# Patient Record
Sex: FEMALE | Race: WHITE | NOT HISPANIC OR LATINO | URBAN - METROPOLITAN AREA
[De-identification: names, ages, dates, MRNs, and addresses within clinical notes are randomized per-mention and may not be internally consistent; named-entity substitution may affect disease eponyms.]

---

## 2018-01-01 ENCOUNTER — HOME CARE/HOSPICE - HEALTHEAST (OUTPATIENT)
Dept: HOSPICE | Facility: HOSPICE | Age: 83
End: 2018-01-01

## 2018-01-01 ENCOUNTER — ANESTHESIA - HEALTHEAST (OUTPATIENT)
Dept: SURGERY | Facility: CLINIC | Age: 83
End: 2018-01-01

## 2018-01-01 ENCOUNTER — OFFICE VISIT - HEALTHEAST (OUTPATIENT)
Dept: GERIATRICS | Facility: CLINIC | Age: 83
End: 2018-01-01

## 2018-01-01 ENCOUNTER — AMBULATORY - HEALTHEAST (OUTPATIENT)
Dept: NEUROSURGERY | Facility: CLINIC | Age: 83
End: 2018-01-01

## 2018-01-01 ENCOUNTER — COMMUNICATION - HEALTHEAST (OUTPATIENT)
Dept: SCHEDULING | Facility: CLINIC | Age: 83
End: 2018-01-01

## 2018-01-01 ENCOUNTER — SURGERY - HEALTHEAST (OUTPATIENT)
Dept: SURGERY | Facility: CLINIC | Age: 83
End: 2018-01-01

## 2018-01-01 ENCOUNTER — AMBULATORY - HEALTHEAST (OUTPATIENT)
Dept: FAMILY MEDICINE | Facility: CLINIC | Age: 83
End: 2018-01-01

## 2018-01-01 ENCOUNTER — AMBULATORY - HEALTHEAST (OUTPATIENT)
Dept: HOSPICE | Facility: HOSPICE | Age: 83
End: 2018-01-01

## 2018-01-01 ENCOUNTER — COMMUNICATION - HEALTHEAST (OUTPATIENT)
Dept: NEUROSURGERY | Facility: CLINIC | Age: 83
End: 2018-01-01

## 2018-01-01 DIAGNOSIS — S06.5XAA TRAUMATIC SUBDURAL HEMATOMA (H): ICD-10-CM

## 2018-01-01 RX ORDER — HYDROMORPHONE HYDROCHLORIDE 1 MG/ML
2 SOLUTION ORAL EVERY 4 HOURS
Status: SHIPPED | COMMUNITY
Start: 2018-01-01

## 2018-01-01 RX ORDER — LORAZEPAM 2 MG/ML
0.5 CONCENTRATE ORAL EVERY 4 HOURS
Status: SHIPPED | COMMUNITY
Start: 2018-01-01

## 2018-01-01 RX ORDER — ACETAMINOPHEN 650 MG/1
650 SUPPOSITORY RECTAL EVERY 4 HOURS PRN
Status: SHIPPED | COMMUNITY
Start: 2018-01-01

## 2018-01-01 RX ORDER — ATROPINE SULFATE 10 MG/ML
2 SOLUTION/ DROPS OPHTHALMIC EVERY 4 HOURS
Status: SHIPPED | COMMUNITY
Start: 2018-01-01

## 2018-01-01 RX ORDER — HYDROMORPHONE HYDROCHLORIDE 1 MG/ML
2 SOLUTION ORAL
Status: SHIPPED | COMMUNITY
Start: 2018-01-01

## 2018-01-01 RX ORDER — LORAZEPAM 2 MG/ML
0.5 CONCENTRATE ORAL
Status: SHIPPED | COMMUNITY
Start: 2018-01-01

## 2018-01-01 ASSESSMENT — MIFFLIN-ST. JEOR
SCORE: 940.2
SCORE: 970.59
SCORE: 968.33
SCORE: 985.56
SCORE: 903.46
SCORE: 981.93
SCORE: 985.73
SCORE: 997.81
SCORE: 969.23
SCORE: 959.25
SCORE: 903.46
SCORE: 992.82
SCORE: 967.87
SCORE: 963.79
SCORE: 957.89

## 2018-04-27 ENCOUNTER — AMBULATORY - HEALTHEAST (OUTPATIENT)
Dept: GERIATRICS | Facility: CLINIC | Age: 83
End: 2018-04-27

## 2021-05-26 ENCOUNTER — RECORDS - HEALTHEAST (OUTPATIENT)
Dept: ADMINISTRATIVE | Facility: CLINIC | Age: 86
End: 2021-05-26

## 2021-05-27 ENCOUNTER — RECORDS - HEALTHEAST (OUTPATIENT)
Dept: ADMINISTRATIVE | Facility: CLINIC | Age: 86
End: 2021-05-27

## 2021-05-28 ENCOUNTER — RECORDS - HEALTHEAST (OUTPATIENT)
Dept: ADMINISTRATIVE | Facility: CLINIC | Age: 86
End: 2021-05-28

## 2021-05-29 ENCOUNTER — RECORDS - HEALTHEAST (OUTPATIENT)
Dept: ADMINISTRATIVE | Facility: CLINIC | Age: 86
End: 2021-05-29

## 2021-05-30 ENCOUNTER — RECORDS - HEALTHEAST (OUTPATIENT)
Dept: ADMINISTRATIVE | Facility: CLINIC | Age: 86
End: 2021-05-30

## 2021-06-01 VITALS — WEIGHT: 132.4 LBS | HEIGHT: 64 IN | BODY MASS INDEX: 22.61 KG/M2

## 2021-06-01 VITALS — WEIGHT: 132 LBS | BODY MASS INDEX: 22.66 KG/M2

## 2021-06-16 PROBLEM — S06.5XAA SDH (SUBDURAL HEMATOMA) (H): Status: ACTIVE | Noted: 2018-01-01

## 2021-06-16 PROBLEM — Z86.718 HISTORY OF DVT (DEEP VEIN THROMBOSIS): Status: ACTIVE | Noted: 2018-01-01

## 2021-06-16 PROBLEM — M48.061 LUMBAR SPINAL STENOSIS: Status: ACTIVE | Noted: 2017-01-01

## 2021-06-16 PROBLEM — Z97.8 USES FEEDING TUBE: Status: ACTIVE | Noted: 2018-01-01

## 2021-06-16 PROBLEM — I48.91 A-FIB (H): Status: ACTIVE | Noted: 2018-01-01

## 2021-06-16 PROBLEM — J96.00 ARF (ACUTE RESPIRATORY FAILURE) (H): Status: ACTIVE | Noted: 2018-01-01

## 2021-06-16 PROBLEM — Z86.711 HISTORY OF PULMONARY EMBOLUS (PE): Status: ACTIVE | Noted: 2018-01-01

## 2021-06-16 PROBLEM — I47.19 PAROXYSMAL ATRIAL TACHYCARDIA (H): Status: ACTIVE | Noted: 2018-01-01

## 2021-06-16 PROBLEM — D69.6 THROMBOCYTOPENIA (H): Status: ACTIVE | Noted: 2018-01-01

## 2021-06-16 PROBLEM — Z66 DO NOT RESUSCITATE: Status: ACTIVE | Noted: 2018-01-01

## 2021-06-16 PROBLEM — Z79.01 CHRONIC ANTICOAGULATION: Status: ACTIVE | Noted: 2018-01-01

## 2021-06-16 PROBLEM — Z98.890 S/P CRANIOTOMY: Status: ACTIVE | Noted: 2018-01-01

## 2021-06-17 NOTE — ANESTHESIA PROCEDURE NOTES
Arterial Line  Reason for Procedure: hemodynamic monitoring  Patient location during procedure: OR post-induction  Start time: 4/5/2018 9:30 AM  End time: 4/5/2018 9:35 AM  Staffing:  Performing  Anesthesiologist: SENAIT VELASQUEZ  Performing CRNA: MADDY HICKMAN  Sterile Precautions:  sterile barriers used during insertion: cap, mask, sterile gloves, large sheet, and hand hygiene used.  Arterial Line:   Immediately prior to procedure a time out was called to verify the correct patient, procedure, equipment, support staff and site/side marked as required  Laterality: right  Location: radial  Prepped with: ChloroPrep    Needle gauge: 20 G  Number of Attempts: 1  Secured with: tape  Flushed with: saline    Ultrasound evaluation of access site: yes        Additional Notes:  Secured with Tegaderm and biopatch

## 2021-06-17 NOTE — ANESTHESIA CARE TRANSFER NOTE
To PACU on vent VSS stable report to RN. RT present attached.     Last vitals:   Vitals:    04/05/18 1304   BP: 115/57   Pulse: 62   Resp: 16   Temp: 36.8  C (98.2  F)   SpO2: 98%     Patient's level of consciousness is unresponsive  Spontaneous respirations: no: vented  Maintains airway independently: no: vented  Dentition unchanged: yes  Oropharynx: oropharynx clear of all foreign objects    QCDR Measures:  ASA# 20 - Surgical Safety Checklist: WHO surgical safety checklist completed prior to induction  PQRS# 430 - Adult PONV Prevention: NA - Not adult patient, not GA or 3 or more risk factors NOT present  ASA# 8 - Peds PONV Prevention: NA - Not pediatric patient, not GA or 2 or more risk factors NOT present  PQRS# 424 - Cecilia-op Temp Management: 4559F - At least one body temp DOCUMENTED => 35.5C or 95.9F within required timeframe  PQRS# 426 - PACU Transfer Protocol: - Transfer of care checklist used  ASA# 14 - Acute Post-op Pain: ASA14B - Patient did NOT experience pain >= 7 out of 10

## 2021-06-17 NOTE — ANESTHESIA PREPROCEDURE EVALUATION
"Anesthesia Evaluation      Patient summary reviewed     Airway   Comment: Unable to assess given patient's mental status.   Pulmonary    (+) decreased breath sounds,                          Cardiovascular   Exercise tolerance: > or = 4 METS  (+) hypertension, CAD, ,     Rhythm: regular  Rate: normal,         Neuro/Psych      Endo/Other       GI/Hepatic/Renal       Other findings: H/o breast cancer Bilateral black eyes, multiple sites of traumatic injury including face, arms and chest.  Most appear superficial in nature.      Dental    (+) edentulous                       Anesthesia Plan  Planned anesthetic: general endotracheal  Patient is in critical condition given the nature of her injury.  Much time was spent counseling family on anesthetic risks and nature of resuscitation.  They have agreed that she would want to proceed with no \"heroic efforts\" meaning no CPR or aggressive measures.  We will plan on a GETA with etomidate and arterial access for monitoring and labs.  ASA 4 - emergent   Induction: intravenous   Anesthetic plan and risks discussed with: patient and child/children (patient is obtunded this this discussion occured with patient's sons)  Anesthesia plan special considerations: antiemetics, arterial catheterization, IV therapy two IVs,   Post-op plan: routine recovery  DNR/DNI status   DNR/DNI status discussed with patient.  Suspension of DNR: patient would have liked cardioversion and medical therapies but no CPR.        "

## 2021-06-17 NOTE — ANESTHESIA POSTPROCEDURE EVALUATION
Patient: Erika Richard  RIGHT CRANIOTOMY FOR EVACUATION OF SUBDURAL HEMATOMA  Anesthesia type: general    Patient location: PACU  Last vitals:   Vitals:    04/05/18 1315   BP:    Pulse: 62   Resp: 22   Temp:    SpO2: 100%     Post vital signs: stable  Level of consciousness: intubated and sedated  Post-anesthesia pain: pain controlled  Post-anesthesia nausea and vomiting: no  Pulmonary: ETT, ventilator  Cardiovascular: stable and blood pressure at baseline  Hydration: adequate  Anesthetic events: no    QCDR Measures:  ASA# 11 - Cecilia-op Cardiac Arrest: ASA11B - Patient did NOT experience unanticipated cardiac arrest  ASA# 12 - Cecilia-op Mortality Rate: ASA12B - Patient did NOT die  ASA# 13 - PACU Re-Intubation Rate: ASA13X - Exclusion: organ donor or direct ICU transfer  ASA# 10 - Composite Anes Safety: ASA10A - No serious adverse event    Additional Notes:  Patient remains intubated and sedated as she is unable to maintain her own airway or respond to commands at this time.  The family is aware.  Report has been given to Rachell Benjamin CNP in ICU.

## 2021-06-17 NOTE — PROGRESS NOTES
[facility]      Facility:    [facility]  [[id number]]  Code Status: DNR/DNI      Chief Complaint/Reason for Visit:  Chief Complaint   Patient presents with     H & P     Fatal subdermal hematoma which was traumatic, chronic anticoagulation management secondary to pulmonary embolism, acute encephalopathy, dysphasia with evidence of aspiration, actively dying.       HPI:   Virginia is a 89 y.o. female who was recently admitted to the hospital on 4/4/2018.  She did have a traumatic accident home in which she fell down and hit her head was brought to the emergency room and admitted to the hospital she did traumatic subdural hematoma in the setting of Coumadin therapy which she was on for pulmonary embolism.  She had a right craniotomy on 4/5/2018 she does have dysphasia.  NG tube was started and placed on 413 she became more somnolent despite CT scan is been stable and no medication effect was suspected.  She did see hospice and was signed on with hospice cares.  She was then transferred here to the TCU in stable condition but declining.    Patient continues to decline and is currently nonresponsive.  She has been declining for the last couple of days and she has been comfortable in the care of hospice and has no new issues.  Her son says she is been comfortable as he is witnessed at this time and nurses indicate that she has been comfortable.    Past Medical History:  Past Medical History:   Diagnosis Date     Arthritis      Breast cancer      CAD (coronary artery disease)      Chronic anticoagulation 4/4/2018     Coronary heart disease 5/6/2008    Overview:  Angiogram done 2011 showing very mild coronary disease     Essential hypertension 4/12/2016     History of pulmonary embolus (PE) 4/4/2018     HLD (hyperlipidemia)      HLD (hyperlipidemia)      Hypertension      Lumbar stenosis      Pulmonary embolism      Squamous cell carcinoma            Surgical History:  Past Surgical  History:   Procedure Laterality Date     APPENDECTOMY       CRANIOTOMY Right 4/5/2018    Procedure: RIGHT CRANIOTOMY FOR EVACUATION OF SUBDURAL HEMATOMA;  Surgeon: Lucy Hidalgo MD;  Location: [facility];  Service:      MASTECTOMY, PARTIAL         Family History:   Family History   Problem Relation Age of Onset     Breast cancer Mother      Heart disease Father        Social History:    Social History     Social History     Marital status:      Spouse name: N/A     Number of children: N/A     Years of education: N/A     Social History Main Topics     Smoking status: Never Smoker     Smokeless tobacco: Never Used     Alcohol use 0.6 oz/week     1 Glasses of wine per week     Drug use: No     Sexual activity: Not Asked     Other Topics Concern     None     Social History Narrative          Review of Systems   Constitutional:        Unable to perform review of systems secondary to patient is nonresponsive.       Vitals:    04/24/18 1349   BP: (!) 129/103   Pulse: (!) 112   Resp: 16   Temp: (!) 101  F (38.3  C)   SpO2: 92%       Physical Exam   Constitutional:   Lying in bed unresponsive and comfortable in no acute distress.   Cardiovascular: Normal rate and regular rhythm.    Murmur heard.  Pulmonary/Chest:   There is no labored breathing however she does have coarse rhonchi and some crackles and rales at the bases.   Abdominal: Soft. Bowel sounds are normal. She exhibits no distension. There is no tenderness.   Musculoskeletal: She exhibits no edema.   Neurological:   Nonresponsive for neurologic exam at pupils were dilated.       Medication List:  Current Outpatient Prescriptions   Medication Sig     acetaminophen (TYLENOL) 650 MG suppository Insert 650 mg into the rectum every 4 (four) hours as needed for fever or pain. 4/21/18 Tylenol 650 mg rectal suppository give rectally q 4 hours PRN for temp > 100,/discomfort  T.O. Makayla Carroll NP/ JOSIANE Núñez RN  Indications: Fever, Pain     atropine 1  % ophthalmic solution Place 2 drops under the tongue every 4 (four) hours. Indications: secretions     bisacodyl (DULCOLAX) 10 mg suppository 1 suppository every 3 days if no Bowel movement     HYDROmorphone (DILAUDID) 0.5 MG solutab Place 0.5 mg under the tongue every hour as needed for pain or dyspnea. 18- hydomorphone 0.5 mg solutab sl q 1 hour PRN pain/dyspnea.  COOPER.ROSY Carroll NP/Meera RN  Indications: pain/dyspnea     HYDROmorphone (DILAUDID) 0.5 MG solutab Place 0.5 mg under the tongue every 4 (four) hours. 18-Hydromorphone 0.5 mg solutab q 4 hours scheduled, and q 1 hour PRN pain/dyspnea. LUI Carroll NP/Meera RN  Indications: pain/dyspnea     LORazepam (ATIVAN) 0.5 MG tablet Take 1 tablet (0.5 mg total) by mouth every 4 (four) hours as needed for anxiety (for anxiety.  May give orally or sublingually.). (Patient not taking: Reported on 2018)     LORazepam (ATIVAN) 0.5 MG tablet Take 0.5 mg by mouth every 4 (four) hours. Indications: anxiety, Muscle Spasm     LORazepam (ATIVAN) 0.5 MG tablet Take 0.5 mg by mouth every 2 (two) hours as needed for anxiety (RESTLESSNESS). Indications: anxiety, Muscle Spasm     OXYGEN-AIR DELIVERY SYSTEMS MISC 3 L into each nostril continuous. Indications: dyspnea     scopolamine (TRANSDERM-SCOP) 1.5 mg transdermal patch Place 1 patch on the skin every third day.       Labs:      Assessment:    ICD-10-CM    1. Traumatic subdural hematoma S06.5X9A        Plan: Plan at this time I did review medications they are appropriate at this time I will follow along with hospice cares and no other changes to medications.  I did approve a scopolamine patch at this time and she will continue her atropine and I likely she will  today.  No other changes to care plan at this time.  I did discuss with family any cares or issues they needed and he did state know that they were fine and they feel that she is comfortable.        Electronically signed by: Ben BEY  DO Noe

## 2021-07-13 ENCOUNTER — RECORDS - HEALTHEAST (OUTPATIENT)
Dept: ADMINISTRATIVE | Facility: CLINIC | Age: 86
End: 2021-07-13

## 2021-07-21 ENCOUNTER — RECORDS - HEALTHEAST (OUTPATIENT)
Dept: ADMINISTRATIVE | Facility: CLINIC | Age: 86
End: 2021-07-21